# Patient Record
Sex: MALE | Race: WHITE | URBAN - METROPOLITAN AREA
[De-identification: names, ages, dates, MRNs, and addresses within clinical notes are randomized per-mention and may not be internally consistent; named-entity substitution may affect disease eponyms.]

---

## 2017-03-17 ENCOUNTER — TELEPHONE (OUTPATIENT)
Dept: FAMILY MEDICINE | Facility: CLINIC | Age: 42
End: 2017-03-17

## 2017-03-17 DIAGNOSIS — G47.30 SLEEP APNEA, UNSPECIFIED TYPE: Primary | ICD-10-CM

## 2017-03-17 NOTE — TELEPHONE ENCOUNTER
Please ask pt what supplies he is referring to, include fax number and forward to provider.  Renea Cedillo RN

## 2017-03-17 NOTE — TELEPHONE ENCOUNTER
Reason for Call:  Medication or medication refill: CPAP SUPPLIES NEEDED ASAP FROM [facility]    Do you use a [facility]?  Name of the pharmacy and phone number for the current request:   Name of the medication requested: CPAP SUPPLIES  Other request: pt has run out of CPAP supplies.  Ariela Pereira was his primary.    Can we leave a detailed message on this number? YES  Phone number patient can be reached at: Cell number on file:    Telephone Information:   Mobile [phone]       Best Time:ANY  Call taken on 3/17/2017 at 2:10 PM by Bess Garcia

## 2017-03-30 ENCOUNTER — TELEPHONE (OUTPATIENT)
Dept: FAMILY MEDICINE | Facility: CLINIC | Age: 42
End: 2017-03-30

## 2017-03-30 ENCOUNTER — MEDICAL CORRESPONDENCE (OUTPATIENT)
Dept: HEALTH INFORMATION MANAGEMENT | Facility: CLINIC | Age: 42
End: 2017-03-30

## 2017-03-30 NOTE — TELEPHONE ENCOUNTER
[facility] forms placed on [facility] for completion.    Dilcia SMITH, Patient Care

## 2017-03-30 NOTE — TELEPHONE ENCOUNTER
Neptali Alicia contacted Nathan on 03/30/17 and left a message. If patient calls back please contact care team.        Neptali Alicia, [facility]

## 2017-03-30 NOTE — TELEPHONE ENCOUNTER
Form signed but looks like needs face to face documentation.  Please contact patient and find out where/when diagnosed with sleep apnea.  Will need their notes faxed as well.

## 2017-03-31 NOTE — TELEPHONE ENCOUNTER
Kavon Carlisle contacted [facility] on 03/31/17 and left a message. If patient calls back please verify where pt was diagnosed with sleep apnea. Let him know he will need to get those notes for this form (fax to us as well at [phone])    Will Orestes REYES  .

## 2017-03-31 NOTE — TELEPHONE ENCOUNTER
Relayed message to patient. He was diagnosed at [facility] in the sleep study, and i told him to have these notes faxed to the [phone].

## 2017-04-04 NOTE — TELEPHONE ENCOUNTER
Forms faxed, although we have not received sleep study notes.  This was okayed per Radha Quezada.      Dilcia SMITH, Patient Care

## 2019-12-15 ENCOUNTER — HEALTH MAINTENANCE LETTER (OUTPATIENT)
Age: 44
End: 2019-12-15

## 2021-01-15 ENCOUNTER — HEALTH MAINTENANCE LETTER (OUTPATIENT)
Age: 46
End: 2021-01-15

## 2021-10-24 ENCOUNTER — HEALTH MAINTENANCE LETTER (OUTPATIENT)
Age: 46
End: 2021-10-24

## 2022-02-13 ENCOUNTER — HEALTH MAINTENANCE LETTER (OUTPATIENT)
Age: 47
End: 2022-02-13

## 2022-10-16 ENCOUNTER — HEALTH MAINTENANCE LETTER (OUTPATIENT)
Age: 47
End: 2022-10-16

## 2023-03-26 ENCOUNTER — HEALTH MAINTENANCE LETTER (OUTPATIENT)
Age: 48
End: 2023-03-26

## 2024-05-26 ENCOUNTER — NURSE TRIAGE (OUTPATIENT)
Dept: NURSING | Facility: CLINIC | Age: 49
End: 2024-05-26

## 2024-05-26 NOTE — TELEPHONE ENCOUNTER
Right ankle injury, happened while [place] basketball[place]    Mild pain  Mild limp when walking  No open wound    PLAN:  Be seen within 24 hours    TREATMENT OF MILD SPRAINS (E.G., MILD SPRAINED ANKLE):  * Use R.I.C.E. (rest, ice, compression, and elevation) for the first 24 to 48 hours.  * Continue to apply crushed ICE in a plastic bag for 10-20 minutes every hour for the first 4 hours. Then apply ice for 10-20 minutes 4 times a day for the first two days.  * Apply COMPRESSION with a snug, elastic bandage for 48 hours. Numbness, tingling, or increased pain means the bandage is too tight.  * Keep injured ankle or foot ELEVATED and at rest for 24 hours.  * After 24 hours of REST, allow any activity that doesn't cause pain.    PAIN MEDICINES:  * For pain relief, you can take either acetaminophen, ibuprofen, or naproxen.  * They are over-the-counter (OTC) pain drugs. You can buy them at the drugstore.  * ACETAMINOPHEN - REGULAR STRENGTH TYLENOL: Take 650 mg (two 325 mg pills) by mouth every 4 to 6 hours as needed. Each Regular Strength Tylenol pill has 325 mg of acetaminophen. The most you should take is 10 pills a day (3,250 mg total). Note: In [place], the maximum is 12 pills a day (3,900 mg total).  * ACETAMINOPHEN - EXTRA STRENGTH TYLENOL: Take 1,000 mg (two 500 mg pills) every 6 to 8 hours as needed. Each Extra Strength Tylenol pill has 500 mg of acetaminophen. The most you should take is 6 pills a day (3,000 mg total). Note: In [place], the maximum is 8 pills a day (4,000 mg total).  * IBUPROFEN (E.G., MOTRIN, ADVIL): Take 400 mg (two 200 mg pills) by mouth every 6 hours. The most you should take is 6 pills a day (1,200 mg total).  * NAPROXEN (E.G., ALEVE): Take 220 mg (one 220 mg pill) by mouth every 8 to 12 hours as needed. You may take 440 mg (two 220 mg pills) for your first dose. The most you should take is 3 pills a day (660 mg total). Note: In [place], the maximum is 2 pills a day (one  every 12 hours; 440 mg total).  * Use the lowest amount of medicine that makes your pain better.    Since he is out of town, he plans to come in to [facility] on 5/28    Mayda Gruber RN/[place] Nurse Advisor      Reason for Disposition   [1] Limp when walking AND [2] due to a twisted ankle or foot    Additional Information   Negative: Serious injury with multiple fractures (broken bones)   Negative: [1] Major bleeding (e.g., actively dripping or spurting) AND [2] can't be stopped   Negative: Amputation   Negative: Looks like a dislocated joint (very crooked or deformed)   Negative: Sounds like a life-threatening emergency to the triager   Negative: Wound looks infected   Negative: Caused by an animal bite   Negative: Caused by a human bite   Negative: Puncture wound of foot   Negative: Toe injury is main concern   Negative: Cast problems or questions   Negative: Bullet wound, stabbed by knife, or other serious penetrating wound   Negative: Skin is split open or gaping (or length > 1/2 inch or 12 mm)   Negative: [1] Bleeding AND [2] won't stop after 10 minutes of direct pressure (using correct technique)   Negative: [1] Dirt in the wound AND [2] not removed with 15 minutes of scrubbing   Negative: Can't stand (bear weight) or walk   Negative: [1] Numbness (new loss of sensation) of toe(s) AND [2] present now   Negative: Sounds like a serious injury to the triager   Negative: [1] SEVERE pain AND [2] not improved 2 hours after pain medicine/ice packs   Negative: Suspicious history for the injury    Protocols used: Ankle and Foot Injury-A-AH

## 2024-06-01 ENCOUNTER — HEALTH MAINTENANCE LETTER (OUTPATIENT)
Age: 49
End: 2024-06-01